# Patient Record
Sex: MALE | URBAN - METROPOLITAN AREA
[De-identification: names, ages, dates, MRNs, and addresses within clinical notes are randomized per-mention and may not be internally consistent; named-entity substitution may affect disease eponyms.]

---

## 2017-04-08 ENCOUNTER — OUTPATIENT (OUTPATIENT)
Dept: OUTPATIENT SERVICES | Facility: HOSPITAL | Age: 67
LOS: 1 days | Discharge: HOME | End: 2017-04-08

## 2017-06-27 DIAGNOSIS — R42 DIZZINESS AND GIDDINESS: ICD-10-CM

## 2023-05-22 PROBLEM — Z00.00 ENCOUNTER FOR PREVENTIVE HEALTH EXAMINATION: Status: ACTIVE | Noted: 2023-05-22

## 2023-06-20 ENCOUNTER — APPOINTMENT (OUTPATIENT)
Dept: UROLOGY | Facility: CLINIC | Age: 73
End: 2023-06-20
Payer: MEDICARE

## 2023-06-20 VITALS
RESPIRATION RATE: 18 BRPM | HEART RATE: 59 BPM | OXYGEN SATURATION: 98 % | DIASTOLIC BLOOD PRESSURE: 81 MMHG | WEIGHT: 185 LBS | SYSTOLIC BLOOD PRESSURE: 154 MMHG | TEMPERATURE: 97 F | HEIGHT: 71 IN | BODY MASS INDEX: 25.9 KG/M2

## 2023-06-20 DIAGNOSIS — Z78.9 OTHER SPECIFIED HEALTH STATUS: ICD-10-CM

## 2023-06-20 DIAGNOSIS — Z82.49 FAMILY HISTORY OF ISCHEMIC HEART DISEASE AND OTHER DISEASES OF THE CIRCULATORY SYSTEM: ICD-10-CM

## 2023-06-20 DIAGNOSIS — E78.00 PURE HYPERCHOLESTEROLEMIA, UNSPECIFIED: ICD-10-CM

## 2023-06-20 PROCEDURE — 99203 OFFICE O/P NEW LOW 30 MIN: CPT

## 2023-06-20 RX ORDER — TAMSULOSIN HYDROCHLORIDE 0.4 MG/1
0.4 CAPSULE ORAL
Qty: 90 | Refills: 3 | Status: ACTIVE | COMMUNITY
Start: 2023-06-20 | End: 1900-01-01

## 2023-06-22 PROBLEM — Z82.49 FAMILY HISTORY OF HYPERTENSION: Status: ACTIVE | Noted: 2023-06-20

## 2023-06-22 PROBLEM — Z78.9 NON-SMOKER: Status: ACTIVE | Noted: 2023-06-20

## 2023-06-22 PROBLEM — E78.00 HIGH CHOLESTEROL: Status: ACTIVE | Noted: 2023-06-20

## 2023-06-22 PROBLEM — Z78.9 DRINKS WINE: Status: ACTIVE | Noted: 2023-06-20

## 2023-06-22 RX ORDER — ATORVASTATIN CALCIUM 20 MG/1
20 TABLET, FILM COATED ORAL
Refills: 0 | Status: ACTIVE | COMMUNITY

## 2023-06-22 NOTE — ASSESSMENT
[FreeTextEntry1] : 73 year old male with nocturia and BPH. We discussed these issues in detail. We discussed that he seems to be doing much better with his behavioral modifications and that he should keep that up. I've refilled his Tamsulosin and he can use this as needed. He can follow up in 4-6 months with a flow rate and PVR. We will make further decisions about any treatments based on this. He understands and all his questions were otherwise answered.Total time=30 min. Seen with RONALDO Patterson.\par \par The submitted E/M billing level for this visit reflects the total time spent on the day of the visit including face-to-face time spent with the patient, non-face-to-face review of medical records and relevant information, documentation, and asynchronous communication with the patient after a visit via phone, email, or patient’s EHR portal after the visit. \par The medical records reviewed are either scanned into the chart or reviewed with the patient using a patient’s electronic medical records portal for patients with records not available to [facility] via electronic transmission platforms from other institutions and labs. \par Time spend counseling and performing coordination of care was also included in determining the appropriate EM billing level.\par \par I have reviewed and verified information regarding the chief complaint and history recorded by the ancillary staff and/or the patient. I have independently reviewed and interpreted tests performed by other physicians and facilities as necessary. \par \par I have discussed with the patient differential diagnosis, reason for auxiliary tests if ordered, risks, benefits, alternatives, and complications of each form of therapy were discussed.\par

## 2023-06-22 NOTE — HISTORY OF PRESENT ILLNESS
[FreeTextEntry1] : 73 year old male who had nocturia x3-4 and presented to Dr. Peterson's office for evaluation. He was started on Tamsulosin and a PSA was drawn. His PSA at that time was 1.34 and his UA was clear. Pt was then referred to me for further evaluation. He ran out of Tamsulosin about a week ago, but feels that his nocturia is much improved. He has nocturia x1, feels that his stream is good, and denies any day time frequency, urgency, gross hematuria, dysuria, or UTIs. Pt tells me that he's made a few lifestyle changes that he feels may have contributed to this improvement: he decreased his fluid intake during the evenings and is no longer drinking cappuccinos at night.

## 2024-01-08 ENCOUNTER — APPOINTMENT (OUTPATIENT)
Dept: UROLOGY | Facility: CLINIC | Age: 74
End: 2024-01-08
Payer: MEDICARE

## 2024-01-08 VITALS
WEIGHT: 185 LBS | SYSTOLIC BLOOD PRESSURE: 159 MMHG | RESPIRATION RATE: 18 BRPM | BODY MASS INDEX: 25.9 KG/M2 | DIASTOLIC BLOOD PRESSURE: 81 MMHG | HEART RATE: 62 BPM | HEIGHT: 71 IN | TEMPERATURE: 96.9 F

## 2024-01-08 DIAGNOSIS — N40.1 BENIGN PROSTATIC HYPERPLASIA WITH LOWER URINARY TRACT SYMPMS: ICD-10-CM

## 2024-01-08 DIAGNOSIS — N13.8 BENIGN PROSTATIC HYPERPLASIA WITH LOWER URINARY TRACT SYMPMS: ICD-10-CM

## 2024-01-08 DIAGNOSIS — R35.1 NOCTURIA: ICD-10-CM

## 2024-01-08 PROCEDURE — 99213 OFFICE O/P EST LOW 20 MIN: CPT | Mod: 25

## 2024-01-08 PROCEDURE — 81003 URINALYSIS AUTO W/O SCOPE: CPT | Mod: QW

## 2024-01-08 PROCEDURE — G2211 COMPLEX E/M VISIT ADD ON: CPT

## 2024-01-08 NOTE — PHYSICAL EXAM
[Normal Appearance] : normal appearance [General Appearance - In No Acute Distress] : no acute distress [Normal Station and Gait] : the gait and station were normal for the patient's age [Oriented To Time, Place, And Person] : oriented to person, place, and time [Affect] : the affect was normal

## 2024-01-11 LAB
BILIRUB UR QL STRIP: NORMAL
COLLECTION METHOD: NORMAL
GLUCOSE UR-MCNC: NORMAL
HCG UR QL: 0.2 EU/DL
HGB UR QL STRIP.AUTO: NORMAL
KETONES UR-MCNC: NORMAL
LEUKOCYTE ESTERASE UR QL STRIP: NORMAL
NITRITE UR QL STRIP: NORMAL
PH UR STRIP: 6
PROT UR STRIP-MCNC: NORMAL
SP GR UR STRIP: 1

## 2024-01-11 NOTE — HISTORY OF PRESENT ILLNESS
[FreeTextEntry1] : 73-year-old male who is being seen in follow up for BPH and nocturia. He was on Tamsulosin but has stopped it and feels his voiding symptoms have not worsened. He has nocturia x1 now and is happy with that. His stream is reasonable, and he feels he empties well. No hematuria, dysuria, UTIs.

## 2024-01-11 NOTE — ASSESSMENT
[FreeTextEntry1] : 73-year-old male with BPH and nocturia. He has not been using Tamsulosin and feels his behavioral modifications have significantly helped him. He will continue to get his PSAs done by Dr. Peterson. He can follow up in 1 year with a flow rate and PVR. All of their questions were otherwise answered. Total time=20 min. Longitudinal care for BPH/LUTS.